# Patient Record
(demographics unavailable — no encounter records)

---

## 2025-05-14 NOTE — PROCEDURE
[Cervical Pap Smear] : cervical Pap smear [Liquid Base] : liquid base [Tolerated Well] : the patient tolerated the procedure well [No Complications] : there were no complications [Suspected Polycystic Ovaries] : suspected polycystic ovaries [Transvaginal Ultrasound] : transvaginal ultrasound [Anteverted] : anteverted [No Fibroid(s)] : no fibroid(s) [L: ___ cm] : L: [unfilled] cm [H: ___ cm] : H: [unfilled] cm [FreeTextEntry5] : Endometrium 6.0 mm [FreeTextEntry7] : 2.3 x 3.2 cm [FreeTextEntry8] : 2.6 x 3.9 cm [FreeTextEntry6] : Bilateral ovaries contain multiple subcentimeter cysts [FreeTextEntry4] : Bilateral polycystic appearing ovaries noted

## 2025-05-14 NOTE — DISCUSSION/SUMMARY
[FreeTextEntry1] : Pap done Self breast exam stressed Prescribed hormone profile Keep menstrual calendar Issues regarding polycystic ovary/polycystic ovary syndrome discussed with patient including etiologies and treatment options Follow-up yearly or as needed

## 2025-05-14 NOTE — PHYSICAL EXAM
[MA] : MA [FreeTextEntry2] : Aparna [Appropriately responsive] : appropriately responsive [Alert] : alert [No Acute Distress] : no acute distress [No Lymphadenopathy] : no lymphadenopathy [Regular Rate Rhythm] : regular rate rhythm [No Murmurs] : no murmurs [Clear to Auscultation B/L] : clear to auscultation bilaterally [Soft] : soft [Non-tender] : non-tender [Non-distended] : non-distended [No HSM] : No HSM [No Lesions] : no lesions [No Mass] : no mass [Oriented x3] : oriented x3 [Examination Of The Breasts] : a normal appearance [No Masses] : no breast masses were palpable [Labia Majora] : normal [Labia Minora] : normal [Normal] : normal [Uterine Adnexae] : normal

## 2025-05-14 NOTE — HISTORY OF PRESENT ILLNESS
[FreeTextEntry1] : Patient is 27 years old para 0-0-0-0 last menstrual period May 3, 2025 Patient states that she has menstrual periods approximately every 40 days Patient states that she has a history of PCOS and did not tolerate metformin